# Patient Record
Sex: FEMALE | Race: OTHER | NOT HISPANIC OR LATINO
[De-identification: names, ages, dates, MRNs, and addresses within clinical notes are randomized per-mention and may not be internally consistent; named-entity substitution may affect disease eponyms.]

---

## 2022-10-26 ENCOUNTER — APPOINTMENT (OUTPATIENT)
Dept: HEMATOLOGY ONCOLOGY | Facility: CLINIC | Age: 77
End: 2022-10-26

## 2022-10-26 VITALS
SYSTOLIC BLOOD PRESSURE: 159 MMHG | TEMPERATURE: 96.7 F | HEART RATE: 76 BPM | DIASTOLIC BLOOD PRESSURE: 85 MMHG | OXYGEN SATURATION: 100 %

## 2022-10-26 DIAGNOSIS — E11.9 TYPE 2 DIABETES MELLITUS W/OUT COMPLICATIONS: ICD-10-CM

## 2022-10-26 DIAGNOSIS — I10 ESSENTIAL (PRIMARY) HYPERTENSION: ICD-10-CM

## 2022-10-26 DIAGNOSIS — I63.9 CEREBRAL INFARCTION, UNSPECIFIED: ICD-10-CM

## 2022-10-26 DIAGNOSIS — R56.9 UNSPECIFIED CONVULSIONS: ICD-10-CM

## 2022-10-26 PROBLEM — Z00.00 ENCOUNTER FOR PREVENTIVE HEALTH EXAMINATION: Status: ACTIVE | Noted: 2022-10-26

## 2022-10-26 PROCEDURE — 99072 ADDL SUPL MATRL&STAF TM PHE: CPT

## 2022-10-26 PROCEDURE — 99204 OFFICE O/P NEW MOD 45 MIN: CPT | Mod: 25

## 2022-10-26 PROCEDURE — 36415 COLL VENOUS BLD VENIPUNCTURE: CPT

## 2022-10-26 RX ORDER — LISINOPRIL 5 MG/1
5 TABLET ORAL
Refills: 0 | Status: ACTIVE | COMMUNITY

## 2022-10-26 RX ORDER — DOCUSATE SODIUM 100 MG
100 TABLET ORAL
Refills: 0 | Status: ACTIVE | COMMUNITY

## 2022-10-26 RX ORDER — ESCITALOPRAM OXALATE 5 MG/1
5 TABLET ORAL
Refills: 0 | Status: ACTIVE | COMMUNITY

## 2022-10-26 RX ORDER — ATORVASTATIN CALCIUM 80 MG/1
80 TABLET, FILM COATED ORAL
Refills: 0 | Status: ACTIVE | COMMUNITY

## 2022-10-26 RX ORDER — FAMOTIDINE 20 MG/1
20 TABLET, FILM COATED ORAL
Refills: 0 | Status: ACTIVE | COMMUNITY

## 2022-10-26 RX ORDER — FOLIC ACID 1 MG/1
1 TABLET ORAL
Refills: 0 | Status: ACTIVE | COMMUNITY

## 2022-11-01 PROBLEM — E11.9 DIABETES MELLITUS: Status: ACTIVE | Noted: 2022-10-26

## 2022-11-01 LAB
25(OH)D3 SERPL-MCNC: 31.2 NG/ML
ALBUMIN SERPL ELPH-MCNC: 3.4 G/DL
ALP BLD-CCNC: 207 U/L
ALT SERPL-CCNC: 20 U/L
ANION GAP SERPL CALC-SCNC: 11 MMOL/L
AST SERPL-CCNC: 18 U/L
B2 MICROGLOB SERPL-MCNC: 9.7 MG/L
BASOPHILS # BLD AUTO: 0.04 K/UL
BASOPHILS NFR BLD AUTO: 0.5 %
BILIRUB SERPL-MCNC: 0.2 MG/DL
BUN SERPL-MCNC: 27 MG/DL
CALCIUM SERPL-MCNC: 8.2 MG/DL
CHLORIDE SERPL-SCNC: 109 MMOL/L
CO2 SERPL-SCNC: 21 MMOL/L
CREAT SERPL-MCNC: 1.78 MG/DL
EGFR: 29 ML/MIN/1.73M2
EOSINOPHIL # BLD AUTO: 0.21 K/UL
EOSINOPHIL NFR BLD AUTO: 2.7 %
ERYTHROCYTE [SEDIMENTATION RATE] IN BLOOD BY WESTERGREN METHOD: 93 MM/HR
ESTIMATED AVERAGE GLUCOSE: 108 MG/DL
FERRITIN SERPL-MCNC: 33 NG/ML
GLUCOSE SERPL-MCNC: 109 MG/DL
HAPTOGLOB SERPL-MCNC: 438 MG/DL
HBA1C MFR BLD HPLC: 5.4 %
HCT VFR BLD CALC: 26.6 %
HGB BLD-MCNC: 7.9 G/DL
IMM GRANULOCYTES NFR BLD AUTO: 0.4 %
IRON SATN MFR SERPL: 10 %
IRON SERPL-MCNC: 28 UG/DL
LDH SERPL-CCNC: 132 U/L
LYMPHOCYTES # BLD AUTO: 3.03 K/UL
LYMPHOCYTES NFR BLD AUTO: 38.7 %
MAN DIFF?: NORMAL
MCHC RBC-ENTMCNC: 27.4 PG
MCHC RBC-ENTMCNC: 29.7 GM/DL
MCV RBC AUTO: 92.4 FL
MONOCYTES # BLD AUTO: 0.43 K/UL
MONOCYTES NFR BLD AUTO: 5.5 %
NEUTROPHILS # BLD AUTO: 4.08 K/UL
NEUTROPHILS NFR BLD AUTO: 52.2 %
PLATELET # BLD AUTO: 415 K/UL
POTASSIUM SERPL-SCNC: 5.3 MMOL/L
PROT SERPL-MCNC: 7 G/DL
RBC # BLD: 2.88 M/UL
RBC # FLD: 14.6 %
SODIUM SERPL-SCNC: 141 MMOL/L
TIBC SERPL-MCNC: 266 UG/DL
UIBC SERPL-MCNC: 238 UG/DL
VIT B12 SERPL-MCNC: 361 PG/ML
WBC # FLD AUTO: 7.82 K/UL

## 2022-11-07 ENCOUNTER — NON-APPOINTMENT (OUTPATIENT)
Age: 77
End: 2022-11-07

## 2022-11-10 ENCOUNTER — NON-APPOINTMENT (OUTPATIENT)
Age: 77
End: 2022-11-10

## 2022-11-28 ENCOUNTER — NON-APPOINTMENT (OUTPATIENT)
Age: 77
End: 2022-11-28

## 2022-12-06 LAB
ALBUMIN MFR SERPL ELPH: 41.4 %
ALBUMIN SERPL-MCNC: 2.9 G/DL
ALBUMIN/GLOB SERPL: 0.7 RATIO
ALPHA1 GLOB MFR SERPL ELPH: 6.3 %
ALPHA1 GLOB SERPL ELPH-MCNC: 0.4 G/DL
ALPHA2 GLOB MFR SERPL ELPH: 16.6 %
ALPHA2 GLOB SERPL ELPH-MCNC: 1.2 G/DL
B-GLOBULIN MFR SERPL ELPH: 13.5 %
B-GLOBULIN SERPL ELPH-MCNC: 0.9 G/DL
DEPRECATED KAPPA LC FREE/LAMBDA SER: 2.03 RATIO
GAMMA GLOB FLD ELPH-MCNC: 1.6 G/DL
GAMMA GLOB MFR SERPL ELPH: 22.2 %
IGA SER QL IEP: 339 MG/DL
IGG SER QL IEP: 1467 MG/DL
IGM SER QL IEP: 115 MG/DL
INTERPRETATION SERPL IEP-IMP: NORMAL
KAPPA LC CSF-MCNC: 6.19 MG/DL
KAPPA LC SERPL-MCNC: 12.56 MG/DL
M PROTEIN MFR SERPL ELPH: 3.4 %
M PROTEIN SPEC IFE-MCNC: NORMAL
MONOCLON BAND OBS SERPL: 0.2 G/DL
PROT SERPL-MCNC: 7 G/DL
PROT SERPL-MCNC: 7 G/DL

## 2023-01-18 ENCOUNTER — LABORATORY RESULT (OUTPATIENT)
Age: 78
End: 2023-01-18

## 2023-01-20 ENCOUNTER — APPOINTMENT (OUTPATIENT)
Dept: INTERVENTIONAL RADIOLOGY/VASCULAR | Facility: HOSPITAL | Age: 78
End: 2023-01-20

## 2023-01-20 ENCOUNTER — RESULT REVIEW (OUTPATIENT)
Age: 78
End: 2023-01-20

## 2023-01-20 ENCOUNTER — OUTPATIENT (OUTPATIENT)
Dept: OUTPATIENT SERVICES | Facility: HOSPITAL | Age: 78
LOS: 1 days | End: 2023-01-20
Payer: MEDICARE

## 2023-01-20 PROCEDURE — 38222 DX BONE MARROW BX & ASPIR: CPT

## 2023-01-20 PROCEDURE — 88342 IMHCHEM/IMCYTCHM 1ST ANTB: CPT | Mod: 26,59

## 2023-01-20 PROCEDURE — 88341 IMHCHEM/IMCYTCHM EA ADD ANTB: CPT

## 2023-01-20 PROCEDURE — 77002 NEEDLE LOCALIZATION BY XRAY: CPT

## 2023-01-20 PROCEDURE — 88313 SPECIAL STAINS GROUP 2: CPT | Mod: 26

## 2023-01-20 PROCEDURE — 77002 NEEDLE LOCALIZATION BY XRAY: CPT | Mod: 26

## 2023-01-20 PROCEDURE — 88341 IMHCHEM/IMCYTCHM EA ADD ANTB: CPT | Mod: 26,59

## 2023-01-20 PROCEDURE — 88311 DECALCIFY TISSUE: CPT | Mod: 26

## 2023-01-20 PROCEDURE — 88189 FLOWCYTOMETRY/READ 16 & >: CPT

## 2023-01-20 PROCEDURE — 38222 DX BONE MARROW BX & ASPIR: CPT | Mod: LT

## 2023-01-20 PROCEDURE — 88305 TISSUE EXAM BY PATHOLOGIST: CPT

## 2023-01-20 PROCEDURE — 88311 DECALCIFY TISSUE: CPT

## 2023-01-20 PROCEDURE — 88305 TISSUE EXAM BY PATHOLOGIST: CPT | Mod: 26

## 2023-01-20 PROCEDURE — 88313 SPECIAL STAINS GROUP 2: CPT

## 2023-01-27 LAB — HEMATOPATHOLOGY REPORT: SIGNIFICANT CHANGE UP

## 2023-01-31 NOTE — ASSESSMENT
[FreeTextEntry1] : 1) repeat blood work patient was found to have iron deficiency with TIBC saturation of 10%... I plan to order intravenous iron to be given to patient at our infusion center, if family agrees.\par \par 2) patient was found to have an elevated kappa light chain... In view of her age, ethnicity,anemia and kidney dysfunction, I recommend bone marrow aspirate and biopsy to rule out myeloma...\par \par I left a voice message today on patient's daughter Lexie's voicemail.\par \par Thanks

## 2023-01-31 NOTE — CONSULT LETTER
[Dear  ___] : Dear  [unfilled], [Consult Letter:] : I had the pleasure of evaluating your patient, [unfilled]. [Please see my note below.] : Please see my note below. [Consult Closing:] : Thank you very much for allowing me to participate in the care of this patient.  If you have any questions, please do not hesitate to contact me. [Sincerely,] : Sincerely, [FreeTextEntry3] : Daina Contreras MD\par

## 2023-01-31 NOTE — HISTORY OF PRESENT ILLNESS
[de-identified] : 76 years old -American female, showed up in our office without an appointment... Patient is unable to give much history... 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 She is on chronic anticoagulation therapy, she does not know the name...

## 2023-01-31 NOTE — ADDENDUM
[FreeTextEntry1] : January 31, 2023 bone marrow aspirate and biopsy done NO myeloma!\par \par At this point patient has a monoclonal gammopathy of undetermined significance, please have patient follow-up with us.\par \par Thank you

## 2023-02-08 ENCOUNTER — APPOINTMENT (OUTPATIENT)
Dept: HEMATOLOGY ONCOLOGY | Facility: CLINIC | Age: 78
End: 2023-02-08

## 2023-03-27 ENCOUNTER — APPOINTMENT (OUTPATIENT)
Dept: HEMATOLOGY ONCOLOGY | Facility: CLINIC | Age: 78
End: 2023-03-27

## 2023-07-28 ENCOUNTER — APPOINTMENT (OUTPATIENT)
Dept: HEMATOLOGY ONCOLOGY | Facility: CLINIC | Age: 78
End: 2023-07-28
Payer: COMMERCIAL

## 2023-07-28 ENCOUNTER — LABORATORY RESULT (OUTPATIENT)
Age: 78
End: 2023-07-28

## 2023-07-28 VITALS
HEIGHT: 65 IN | WEIGHT: 190 LBS | DIASTOLIC BLOOD PRESSURE: 84 MMHG | OXYGEN SATURATION: 98 % | RESPIRATION RATE: 18 BRPM | TEMPERATURE: 97.1 F | HEART RATE: 72 BPM | SYSTOLIC BLOOD PRESSURE: 163 MMHG | BODY MASS INDEX: 31.65 KG/M2

## 2023-07-28 DIAGNOSIS — D50.9 IRON DEFICIENCY ANEMIA, UNSPECIFIED: ICD-10-CM

## 2023-07-28 DIAGNOSIS — D64.9 ANEMIA, UNSPECIFIED: ICD-10-CM

## 2023-07-28 DIAGNOSIS — D47.2 MONOCLONAL GAMMOPATHY: ICD-10-CM

## 2023-07-28 LAB
FERRITIN SERPL-MCNC: 53 NG/ML
IRON SATN MFR SERPL: 15 %
IRON SERPL-MCNC: 39 UG/DL
RBC # BLD: 3.51 M/UL
RETICS # AUTO: 1.8 %
RETICS AGGREG/RBC NFR: 62.5 K/UL
TIBC SERPL-MCNC: 257 UG/DL
UIBC SERPL-MCNC: 218 UG/DL

## 2023-07-28 PROCEDURE — 36415 COLL VENOUS BLD VENIPUNCTURE: CPT

## 2023-07-28 PROCEDURE — 99214 OFFICE O/P EST MOD 30 MIN: CPT | Mod: 25

## 2023-07-28 RX ORDER — METOPROLOL SUCCINATE 25 MG/1
25 TABLET, EXTENDED RELEASE ORAL
Refills: 0 | Status: ACTIVE | COMMUNITY

## 2023-07-28 RX ORDER — CLOPIDOGREL 75 MG/1
75 TABLET, FILM COATED ORAL
Refills: 0 | Status: ACTIVE | COMMUNITY

## 2023-07-28 RX ORDER — CHLORHEXIDINE GLUCONATE 4 %
325 (65 FE) LIQUID (ML) TOPICAL DAILY
Qty: 30 | Refills: 3 | Status: ACTIVE | COMMUNITY
Start: 2023-07-28 | End: 1900-01-01

## 2023-07-28 RX ORDER — LEVETIRACETAM 750 MG/1
750 TABLET, FILM COATED ORAL
Refills: 0 | Status: ACTIVE | COMMUNITY

## 2023-07-28 NOTE — ASSESSMENT
[FreeTextEntry1] : Sue Love is a 77 year old female here for follow up of anemia.\par \par Plan:\par 1.  Anemia\par --anemia is multifactorial.  There is a component of iron deficiency and this should be treated.  Support for the dx of RUI comes from recent labs (ferritin 40 in 5/2023) and bone marrow bx 1/2023 showing absent iron stores.  \par --there is also a component of anemia due to chronic kidney disease.  Her last creatinine 5/2023 was 1.9.  This is severe enough to be associated with a relative erythropoietin deficiency.\par --repeating labs today including CBC, iron studies\par --discussed options for treating the iron deficiency w/ the patient and her daughter.  PO iron vs IV.   The patient requires EMS transport to come to appointments and her daughter has to take off work;  this is a huge burden for them.  Since she has never attempted PO iron treatment, I think reasonable to start w/ that strategy first.  Suggested ferrous sulfate 325 mg po Daily.  If nausea or constipation - can decrease to TIW.    We discussed IV iron as a "plan B" if PO iron supplementation is ineffective or not tolerated;  her daughter will call if they wish to pursue this.\par --no indication for procrit (or other erythropoiesis stimulating agent treatment) until iron stores are maximally repleted.  Would only consider if hemoglobin significantly less than 10 with replete iron stores.  Would be challenging considering that this treatment would require regular follow up in the office.\par \par 2.  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  Bone marrow biopsy 1/2023 was negative for multiple myeloma.  At her age, progression to a symptomatic plasma cell dyscrasia is unlikely.  Repeating paraprotein panel today but unless there is a significant change from past labs, would only repeat in the future if concerns arise for multiple myeloma.  She is unlikely to be a candidate for treatment of myeloma, if identified, due to her bedbound status and the toxicities of the antineoplastic therapies used for that disease.\par \par RTC PRN - happy to see her back if needed in the future - will defer to PCP to monitor/treat iron deficiency unless there is wish to explore IV iron.  This decision is at the preference of the pt and her daughter, and I think reasonable all things considered.

## 2023-07-28 NOTE — HISTORY OF PRESENT ILLNESS
[de-identified] : Sue Love is a 77 year old female here for follow up of anemia.\par \par The patient is accompanied by her daughter Lexie.  \par \par The patient saw Dr Contreras last year (2022) for anemia.  Hemoglobin was 7.9.  Creatinine was elevated.  SPEP showed a 0.2 g/dL M-spike, SIFE IgG lambda.  Serum free light chain levels were slightly elevated, although Kappa more than Lambda, for ratio 2.0.  Ferritin was 33 and iron sat 10%.  She was referred for a bone marrow bx to r/o multiple myeloma.  This was performed 1/2023.  The marrow was normocellular, with normal % plasma cells.  Those plasma cells that were present were polyclonal.  No reported dysplasia.  The karyotype was normal.  Iron stain showed decreased iron stores.  Thus the marrow was more consistent with iron deficiency anemia than anything else.  The marrow was negative for myeloma.\par \par She presents for follow up at the behest of her PCP.  Labs 5/25/23 showed hgb 8.9, creatinine 1.9, calcium/total protein/albumin normal;  ferritin 40;  iron sat 13%;   TSH normal.\par She is on an MVI but has never been on iron supplementation.\par Dr Contreras suggested IV iron last year but the patient dislikes coming out to appointments (it is extremely difficult to get here).\par She and her daughter deny blood in stool or urine.\par She had a colonoscopy a few years ago - in fact had a stroke after the colonoscopy - so would not agree to any more colonoscopies since she feels that is what caused the stroke.\par \par \par SH:  she is bedbound due to strokes.  Cannot walk.  Brought to clinic by ambulance from her home.  has 24 hour care through home health aide and her godson who stays with her overnight.

## 2023-07-28 NOTE — PHYSICAL EXAM
[Normal] : affect appropriate [de-identified] : chronically ill appearing, obese elderly female on an EMS gurney/litter. [de-identified] : trace LE edema [de-identified] : soft, not tender, nondistended.  Difficult to evaluate for organomegaly due to positioning on the litter and central abdominal obesity [de-identified] : pallor of mucosal membranes [de-identified] : AAO x 3.  CN 2-12 grossly intact.  She is pleasant and answers questions appropriately

## 2023-08-01 LAB
FOLATE SERPL-MCNC: >20 NG/ML
VIT B12 SERPL-MCNC: 368 PG/ML

## 2023-08-02 LAB
ALBUMIN MFR SERPL ELPH: 42.2 %
ALBUMIN SERPL-MCNC: 3.1 G/DL
ALBUMIN/GLOB SERPL: 0.7 RATIO
ALPHA1 GLOB MFR SERPL ELPH: 6.2 %
ALPHA1 GLOB SERPL ELPH-MCNC: 0.5 G/DL
ALPHA2 GLOB MFR SERPL ELPH: 16.6 %
ALPHA2 GLOB SERPL ELPH-MCNC: 1.2 G/DL
B-GLOBULIN MFR SERPL ELPH: 12.8 %
B-GLOBULIN SERPL ELPH-MCNC: 0.9 G/DL
DEPRECATED KAPPA LC FREE/LAMBDA SER: 1.94 RATIO
GAMMA GLOB FLD ELPH-MCNC: 1.6 G/DL
GAMMA GLOB MFR SERPL ELPH: 22.2 %
IGA SER QL IEP: 368 MG/DL
IGG SER QL IEP: 1589 MG/DL
IGM SER QL IEP: 140 MG/DL
INTERPRETATION SERPL IEP-IMP: NORMAL
KAPPA LC CSF-MCNC: 6.13 MG/DL
KAPPA LC SERPL-MCNC: 11.87 MG/DL
M PROTEIN MFR SERPL ELPH: 2.7 %
M PROTEIN SPEC IFE-MCNC: NORMAL
MONOCLON BAND OBS SERPL: 0.2 G/DL
PROT SERPL-MCNC: 7.3 G/DL
PROT SERPL-MCNC: 7.3 G/DL

## 2023-08-15 ENCOUNTER — APPOINTMENT (OUTPATIENT)
Dept: MAMMOGRAPHY | Facility: HOSPITAL | Age: 78
End: 2023-08-15

## 2024-04-18 ENCOUNTER — APPOINTMENT (OUTPATIENT)
Dept: HEMATOLOGY ONCOLOGY | Facility: CLINIC | Age: 79
End: 2024-04-18

## 2024-04-22 ENCOUNTER — APPOINTMENT (OUTPATIENT)
Dept: MAMMOGRAPHY | Facility: HOSPITAL | Age: 79
End: 2024-04-22

## 2024-07-18 ENCOUNTER — APPOINTMENT (OUTPATIENT)
Dept: HEMATOLOGY ONCOLOGY | Facility: CLINIC | Age: 79
End: 2024-07-18

## 2024-08-23 NOTE — HISTORY OF PRESENT ILLNESS
[de-identified] : Sue Love is a 77 year old female here for follow up of anemia.\par  \par  The patient is accompanied by her daughter Lexie.  \par  \par  The patient saw Dr Contreras last year (2022) for anemia.  Hemoglobin was 7.9.  Creatinine was elevated.  SPEP showed a 0.2 g/dL M-spike, SIFE IgG lambda.  Serum free light chain levels were slightly elevated, although Kappa more than Lambda, for ratio 2.0.  Ferritin was 33 and iron sat 10%.  She was referred for a bone marrow bx to r/o multiple myeloma.  This was performed 1/2023.  The marrow was normocellular, with normal % plasma cells.  Those plasma cells that were present were polyclonal.  No reported dysplasia.  The karyotype was normal.  Iron stain showed decreased iron stores.  Thus the marrow was more consistent with iron deficiency anemia than anything else.  The marrow was negative for myeloma.\par  \par  She presents for follow up at the behest of her PCP.  Labs 5/25/23 showed hgb 8.9, creatinine 1.9, calcium/total protein/albumin normal;  ferritin 40;  iron sat 13%;   TSH normal.\par  She is on an MVI but has never been on iron supplementation.\par  Dr Contreras suggested IV iron last year but the patient dislikes coming out to appointments (it is extremely difficult to get here).\par  She and her daughter deny blood in stool or urine.\par  She had a colonoscopy a few years ago - in fact had a stroke after the colonoscopy - so would not agree to any more colonoscopies since she feels that is what caused the stroke.\par  \par  \par  SH:  she is bedbound due to strokes.  Cannot walk.  Brought to clinic by ambulance from her home.  has 24 hour care through home health aide and her godson who stays with her overnight.

## 2024-08-23 NOTE — PHYSICAL EXAM
[Normal] : affect appropriate [de-identified] : chronically ill appearing, obese elderly female on an EMS gurney/litter. [de-identified] : trace LE edema [de-identified] : soft, not tender, nondistended.  Difficult to evaluate for organomegaly due to positioning on the litter and central abdominal obesity [de-identified] : pallor of mucosal membranes [de-identified] : AAO x 3.  CN 2-12 grossly intact.  She is pleasant and answers questions appropriately

## 2024-08-28 ENCOUNTER — APPOINTMENT (OUTPATIENT)
Dept: HEMATOLOGY ONCOLOGY | Facility: CLINIC | Age: 79
End: 2024-08-28

## 2024-10-09 ENCOUNTER — APPOINTMENT (OUTPATIENT)
Dept: HEMATOLOGY ONCOLOGY | Facility: CLINIC | Age: 79
End: 2024-10-09

## 2024-10-09 ENCOUNTER — NON-APPOINTMENT (OUTPATIENT)
Age: 79
End: 2024-10-09

## 2024-12-19 ENCOUNTER — APPOINTMENT (OUTPATIENT)
Dept: HEMATOLOGY ONCOLOGY | Facility: CLINIC | Age: 79
End: 2024-12-19

## 2025-02-05 ENCOUNTER — APPOINTMENT (OUTPATIENT)
Dept: HEMATOLOGY ONCOLOGY | Facility: CLINIC | Age: 80
End: 2025-02-05
Payer: COMMERCIAL

## 2025-02-05 VITALS
DIASTOLIC BLOOD PRESSURE: 80 MMHG | TEMPERATURE: 97.9 F | SYSTOLIC BLOOD PRESSURE: 163 MMHG | HEART RATE: 77 BPM | OXYGEN SATURATION: 100 % | RESPIRATION RATE: 18 BRPM

## 2025-02-05 DIAGNOSIS — D47.2 MONOCLONAL GAMMOPATHY: ICD-10-CM

## 2025-02-05 DIAGNOSIS — D64.9 ANEMIA, UNSPECIFIED: ICD-10-CM

## 2025-02-05 LAB
BASOPHILS # BLD AUTO: 0.07 K/UL
BASOPHILS NFR BLD AUTO: 0.9 %
EOSINOPHIL # BLD AUTO: 0.13 K/UL
EOSINOPHIL NFR BLD AUTO: 1.7 %
FERRITIN SERPL-MCNC: 659 NG/ML
HCT VFR BLD CALC: 28.8 %
HGB BLD-MCNC: 9.2 G/DL
IMM GRANULOCYTES NFR BLD AUTO: 0.3 %
IRON SATN MFR SERPL: 24 %
IRON SERPL-MCNC: 62 UG/DL
LYMPHOCYTES # BLD AUTO: 3.26 K/UL
LYMPHOCYTES NFR BLD AUTO: 43.4 %
MAN DIFF?: NORMAL
MCHC RBC-ENTMCNC: 28.8 PG
MCHC RBC-ENTMCNC: 31.9 G/DL
MCV RBC AUTO: 90 FL
MONOCYTES # BLD AUTO: 0.47 K/UL
MONOCYTES NFR BLD AUTO: 6.3 %
NEUTROPHILS # BLD AUTO: 3.56 K/UL
NEUTROPHILS NFR BLD AUTO: 47.4 %
PLATELET # BLD AUTO: 318 K/UL
PMV BLD AUTO: 0 /100 WBCS
RBC # BLD: 3.2 M/UL
RBC # FLD: 13.9 %
TIBC SERPL-MCNC: 261 UG/DL
UIBC SERPL-MCNC: 199 UG/DL
WBC # FLD AUTO: 7.51 K/UL

## 2025-02-05 PROCEDURE — 99214 OFFICE O/P EST MOD 30 MIN: CPT | Mod: 25

## 2025-02-05 PROCEDURE — 36415 COLL VENOUS BLD VENIPUNCTURE: CPT

## 2025-02-05 RX ORDER — SEVELAMER CARBONATE 800 MG/1
TABLET, FILM COATED ORAL
Refills: 0 | Status: ACTIVE | COMMUNITY

## 2025-02-05 RX ORDER — SODIUM BICARBONATE 650 MG/1
TABLET ORAL
Refills: 0 | Status: ACTIVE | COMMUNITY

## 2025-02-05 RX ORDER — AMLODIPINE BESYLATE 5 MG/1
TABLET ORAL
Refills: 0 | Status: ACTIVE | COMMUNITY

## 2025-02-05 RX ORDER — CARVEDILOL 3.12 MG/1
TABLET, FILM COATED ORAL
Refills: 0 | Status: ACTIVE | COMMUNITY

## 2025-02-06 LAB
ALBUMIN SERPL ELPH-MCNC: 3.6 G/DL
ALP BLD-CCNC: 143 U/L
ALT SERPL-CCNC: 25 U/L
ANION GAP SERPL CALC-SCNC: 17 MMOL/L
AST SERPL-CCNC: 18 U/L
BILIRUB SERPL-MCNC: 0.3 MG/DL
BUN SERPL-MCNC: 74 MG/DL
CALCIUM SERPL-MCNC: 7.1 MG/DL
CHLORIDE SERPL-SCNC: 110 MMOL/L
CO2 SERPL-SCNC: 18 MMOL/L
CREAT SERPL-MCNC: 4.87 MG/DL
EGFR: 9 ML/MIN/1.73M2
FOLATE SERPL-MCNC: >20 NG/ML
GLUCOSE SERPL-MCNC: 131 MG/DL
POTASSIUM SERPL-SCNC: 4.8 MMOL/L
PROT SERPL-MCNC: 7.4 G/DL
SODIUM SERPL-SCNC: 145 MMOL/L
VIT B12 SERPL-MCNC: 428 PG/ML

## 2025-02-12 LAB
ALBUMIN MFR SERPL ELPH: 44.8 %
ALBUMIN SERPL-MCNC: 3.1 G/DL
ALBUMIN/GLOB SERPL: 0.8 RATIO
ALPHA1 GLOB MFR SERPL ELPH: 5.6 %
ALPHA1 GLOB SERPL ELPH-MCNC: 0.4 G/DL
ALPHA2 GLOB MFR SERPL ELPH: 15.3 %
ALPHA2 GLOB SERPL ELPH-MCNC: 1.1 G/DL
B-GLOBULIN MFR SERPL ELPH: 12.3 %
B-GLOBULIN SERPL ELPH-MCNC: 0.9 G/DL
DEPRECATED KAPPA LC FREE/LAMBDA SER: 2.01 RATIO
GAMMA GLOB FLD ELPH-MCNC: 1.5 G/DL
GAMMA GLOB MFR SERPL ELPH: 22 %
IGA SER QL IEP: 290 MG/DL
IGG SER QL IEP: 1540 MG/DL
IGM SER QL IEP: 111 MG/DL
INTERPRETATION SERPL IEP-IMP: NORMAL
KAPPA LC CSF-MCNC: 8.16 MG/DL
KAPPA LC SERPL-MCNC: 16.37 MG/DL
M PROTEIN MFR SERPL ELPH: 3.4 %
M PROTEIN SPEC IFE-MCNC: NORMAL
MONOCLON BAND OBS SERPL: 0.2 G/DL
PROT SERPL-MCNC: 7 G/DL
PROT SERPL-MCNC: 7 G/DL